# Patient Record
(demographics unavailable — no encounter records)

---

## 2024-12-26 NOTE — HISTORY OF PRESENT ILLNESS
[FreeTextEntry1] : 22-year-old male with no significant past medical history comes in accompanied by his mother for evaluation of chest discomfort.  Patient reports a few weeks of a substernal chest pressure like sensation worse when lying down.  No exacerbation of symptoms on exertional activity no associated shortness of breath PND orthopnea.

## 2024-12-26 NOTE — DISCUSSION/SUMMARY
[FreeTextEntry1] : 1.  Chest discomfort: Atypical; EKG reviewed normal sinus rhythm within normal limits.  Will obtain blood work specifically inflammatory markers, and advised to try Pepcid over-the-counter if symptoms persist or worsen will consider ischemic workup.  Echocardiogram. [EKG obtained to assist in diagnosis and management of assessed problem(s)] : EKG obtained to assist in diagnosis and management of assessed problem(s)

## 2024-12-26 NOTE — PHYSICAL EXAM
